# Patient Record
Sex: MALE | Race: OTHER | HISPANIC OR LATINO | Employment: FULL TIME | URBAN - METROPOLITAN AREA
[De-identification: names, ages, dates, MRNs, and addresses within clinical notes are randomized per-mention and may not be internally consistent; named-entity substitution may affect disease eponyms.]

---

## 2022-05-17 ENCOUNTER — APPOINTMENT (EMERGENCY)
Dept: RADIOLOGY | Facility: HOSPITAL | Age: 47
End: 2022-05-17
Payer: MEDICARE

## 2022-05-17 ENCOUNTER — HOSPITAL ENCOUNTER (EMERGENCY)
Facility: HOSPITAL | Age: 47
Discharge: HOME | End: 2022-05-17
Attending: EMERGENCY MEDICINE
Payer: MEDICARE

## 2022-05-17 VITALS
TEMPERATURE: 97.9 F | SYSTOLIC BLOOD PRESSURE: 172 MMHG | WEIGHT: 238.32 LBS | DIASTOLIC BLOOD PRESSURE: 80 MMHG | HEART RATE: 70 BPM | OXYGEN SATURATION: 98 % | RESPIRATION RATE: 18 BRPM

## 2022-05-17 DIAGNOSIS — M54.50 RIGHT LOW BACK PAIN: Primary | ICD-10-CM

## 2022-05-17 PROCEDURE — 96372 THER/PROPH/DIAG INJ SC/IM: CPT

## 2022-05-17 PROCEDURE — 72100 X-RAY EXAM L-S SPINE 2/3 VWS: CPT

## 2022-05-17 PROCEDURE — 99284 EMERGENCY DEPT VISIT MOD MDM: CPT | Performed by: EMERGENCY MEDICINE

## 2022-05-17 PROCEDURE — 99283 EMERGENCY DEPT VISIT LOW MDM: CPT

## 2022-05-17 RX ORDER — KETOROLAC TROMETHAMINE 30 MG/ML
30 INJECTION, SOLUTION INTRAMUSCULAR; INTRAVENOUS ONCE
Status: COMPLETED | OUTPATIENT
Start: 2022-05-17 | End: 2022-05-17

## 2022-05-17 RX ORDER — NAPROXEN 500 MG/1
500 TABLET ORAL 2 TIMES DAILY WITH MEALS
Qty: 20 TABLET | Refills: 0 | Status: SHIPPED | OUTPATIENT
Start: 2022-05-17 | End: 2022-05-27

## 2022-05-17 RX ORDER — METHOCARBAMOL 500 MG/1
500 TABLET, FILM COATED ORAL 2 TIMES DAILY
Qty: 20 TABLET | Refills: 0 | Status: SHIPPED | OUTPATIENT
Start: 2022-05-17

## 2022-05-17 RX ADMIN — KETOROLAC TROMETHAMINE 30 MG: 30 INJECTION, SOLUTION INTRAMUSCULAR at 10:50

## 2022-05-17 NOTE — DISCHARGE INSTRUCTIONS
Take the naprosyn twice daily for the next 10 days  Use the Robaxin as needed for muscle spasm  Use an electric heating pad over your sore muscles, 4-5 times daily, 20 minutes each time  Make sure to drink plenty of fluids  An order for the [facility] has been placed, they should be in contact with you  If you have not heard from them in 48 hours, call the number included in these discharge instructions

## 2022-05-17 NOTE — Clinical Note
Mario Ashford was seen and treated in our emergency department on 5/17/2022  No restrictions            Diagnosis:     Benjamín  may return to work on return date  He may return on this date: 05/18/2022         If you have any questions or concerns, please don't hesitate to call        Ioana Barriga MD    ______________________________           _______________          _______________  Hospital Representative                              Date                                Time

## 2022-05-17 NOTE — ED PROVIDER NOTES
History  Chief Complaint   Patient presents with    Groin Pain     Right sided groin pain x1 week  Does a lot of heavy lifting at work  Taking tylenol without relief  48 YO male presents with pain in the Right lower back for the last week  Pt states this pain has been constant, aching, worse with bending, squatting and lifting heavy objects  The pain radiates around to the groin and down into the leg  Pt states he has required to move to light duty at work due to the discomfort  He has been taking acetaminophen and ibuprofen for this without significant relief  Pt denies similar pain in the past, he denies injury to the area  He has been able to ambulate  Pt denies CP/SOB/F/C/N/V/D/C, no dysuria, burning on urination or blood in urine  History provided by:  Patient   used: No    Back Pain  Location:  Lumbar spine  Quality:  Aching  Radiates to:  R posterior upper leg (Right groin)  Pain severity:  Moderate  Onset quality:  Gradual  Duration:  1 week  Timing:  Constant  Progression:  Waxing and waning  Chronicity:  New  Context: lifting heavy objects    Relieved by:  Nothing  Worsened by:  Bending and twisting  Ineffective treatments:  Ibuprofen  Associated symptoms: no abdominal pain, no chest pain, no dysuria, no fever, no numbness and no weakness        None       History reviewed  No pertinent past medical history  Past Surgical History:   Procedure Laterality Date    CHOLECYSTECTOMY         History reviewed  No pertinent family history  I have reviewed and agree with the history as documented  E-Cigarette/Vaping     E-Cigarette/Vaping Substances     Social History     Tobacco Use    Smoking status: Never Smoker    Smokeless tobacco: Never Used   Substance Use Topics    Alcohol use: Not Currently       Review of Systems   Constitutional: Negative for fever  HENT: Negative for dental problem  Eyes: Negative for visual disturbance     Respiratory: Negative for shortness of breath  Cardiovascular: Negative for chest pain  Gastrointestinal: Negative for abdominal pain, nausea and vomiting  Genitourinary: Negative for dysuria and frequency  Musculoskeletal: Positive for back pain  Negative for neck pain and neck stiffness  Skin: Negative for rash  Neurological: Negative for dizziness, weakness, light-headedness and numbness  Psychiatric/Behavioral: Negative for agitation, behavioral problems and confusion  All other systems reviewed and are negative  Physical Exam  Physical Exam  Vitals and nursing note reviewed  Constitutional:       Appearance: He is well-developed  HENT:      Head: Normocephalic and atraumatic  Eyes:      Extraocular Movements: Extraocular movements intact  Cardiovascular:      Rate and Rhythm: Normal rate  Pulmonary:      Effort: Pulmonary effort is normal    Abdominal:      General: There is no distension  Musculoskeletal:         General: Normal range of motion  Cervical back: Normal range of motion  Comments: Tender to palpation over the Right lower back, paraspinal  Palpation worsens radiating discomfort  Skin:     Findings: No rash  Neurological:      Mental Status: He is alert and oriented to person, place, and time     Psychiatric:         Behavior: Behavior normal          Vital Signs  ED Triage Vitals   Temperature Pulse Respirations Blood Pressure SpO2   05/17/22 1006 05/17/22 1006 05/17/22 1006 05/17/22 1006 05/17/22 1006   97 9 °F (36 6 °C) 70 18 (!) 172/80 98 %      Temp src Heart Rate Source Patient Position - Orthostatic VS BP Location FiO2 (%)   -- 05/17/22 1006 05/17/22 1006 05/17/22 1006 --    Monitor Sitting Right arm       Pain Score       05/17/22 1050       10 - Worst Possible Pain           Vitals:    05/17/22 1006   BP: (!) 172/80   Pulse: 70   Patient Position - Orthostatic VS: Sitting         Visual Acuity      ED Medications  Medications   ketorolac (TORADOL) injection 30 mg (30 mg Intramuscular Given 5/17/22 1050)       Diagnostic Studies  Results Reviewed     None                 XR lumbar spine 2 or 3 views    (Results Pending)              Procedures  Procedures         ED Course                               SBIRT 22yo+    Flowsheet Row Most Recent Value   SBIRT (23 yo +)    In order to provide better care to our patients, we are screening all of our patients for alcohol and drug use  Would it be okay to ask you these screening questions? Yes Filed at: 05/17/2022 1011   Initial Alcohol Screen: US AUDIT-C     1  How often do you have a drink containing alcohol? 0 Filed at: 05/17/2022 1011   2  How many drinks containing alcohol do you have on a typical day you are drinking? 0 Filed at: 05/17/2022 1011   3a  Male UNDER 65: How often do you have five or more drinks on one occasion? 0 Filed at: 05/17/2022 1011   3b  FEMALE Any Age, or MALE 65+: How often do you have 4 or more drinks on one occassion? 0 Filed at: 05/17/2022 1011   Audit-C Score 0 Filed at: 05/17/2022 1011   SEBASTIEN: How many times in the past year have you    Used an illegal drug or used a prescription medication for non-medical reasons? Never Filed at: 05/17/2022 1011                    MDM  Number of Diagnoses or Management Options  Right low back pain: new and requires workup  Diagnosis management comments: 1  Back pain - Pt with likely MSK back pain, tender paraspinal  Will x-ray, will give IM Toradol  Likely prescribe further NSAIDs and muscle relaxer  Pt may benefit from PT         Amount and/or Complexity of Data Reviewed  Tests in the radiology section of CPT®: ordered and reviewed  Independent visualization of images, tracings, or specimens: yes    Patient Progress  Patient progress: stable      Disposition  Final diagnoses:   Right low back pain     Time reflects when diagnosis was documented in both MDM as applicable and the Disposition within this note     Time User Action Codes Description Comment    5/17/2022 10:58 AM Marco Antonioemily SHETTY Kendrick [M54 [id number]] Right low back pain       ED Disposition     ED Disposition   Discharge    Condition   Stable    Date/Time   Tue May 17, 2022 10:58 AM    Comment   Riki Gillespie discharge to home/self care  Follow-up Information     Follow up With Specialties Details Why Contact Info Additional Information    [facility] Physical [facility]   [phone]          Discharge Medication List as of 5/17/2022 10:59 AM      START taking these medications    Details   methocarbamol (ROBAXIN) 500 mg tablet Take 1 tablet (500 mg total) by mouth in the morning and 1 tablet (500 mg total) in the evening , Starting Tue 5/17/2022, Print      naproxen (NAPROSYN) 500 mg tablet Take 1 tablet (500 mg total) by mouth in the morning and 1 tablet (500 mg total) in the evening  Take with meals  Do all this for 10 days  , Starting Tue 5/17/2022, Until Fri 5/27/2022, Print                 PDMP Review     None          ED Provider  Electronically Signed by           Vanna Reyes MD  05/17/22 4795

## 2022-05-18 ENCOUNTER — TELEPHONE (OUTPATIENT)
Dept: PHYSICAL THERAPY | Facility: OTHER | Age: 47
End: 2022-05-18

## 2022-05-18 NOTE — TELEPHONE ENCOUNTER
Message left for patient regarding referral entered for  [facility]  Contact information, hours of operation and VM instructions left  Nurse requested patient to CB if needed and leave Full Name &  on VM       Referral deferred for f/u    HI?

## 2022-05-19 ENCOUNTER — NURSE TRIAGE (OUTPATIENT)
Dept: PHYSICAL THERAPY | Facility: OTHER | Age: 47
End: 2022-05-19

## 2022-05-19 DIAGNOSIS — M54.50 ACUTE RIGHT-SIDED LOW BACK PAIN, UNSPECIFIED WHETHER SCIATICA PRESENT: Primary | ICD-10-CM

## 2022-05-19 NOTE — TELEPHONE ENCOUNTER
Additional Information   Affirmative: Is this related to a work injury?  Negative: Is this related to an MVA?  Negative: Are you currently recieving homecare services? Background - Initial Assessment  Clinical complaint: Pain is right side low back, started originally in the front, Pain radiates to right hip  States he also has numbness and tingling in bilat hands  Pain started 2 weeks ago  Was seen in the ED  Date of onset: 2 weeks ago   Frequency of pain: constant  Quality of pain: aching and sharp    Protocols used:  [facility]B [facility] PROTOCOL    Patient thinks this should be going through [facility]? He said he filled paperwork out at work , but never heard anything  So he went into the ED       Will place a referral to [address] [place] [address]

## 2022-06-06 ENCOUNTER — TELEPHONE (OUTPATIENT)
Dept: URGENT CARE | Facility: CLINIC | Age: 47
End: 2022-06-06